# Patient Record
Sex: FEMALE | URBAN - METROPOLITAN AREA
[De-identification: names, ages, dates, MRNs, and addresses within clinical notes are randomized per-mention and may not be internally consistent; named-entity substitution may affect disease eponyms.]

---

## 2021-02-08 ENCOUNTER — OFFICE VISIT (OUTPATIENT)
Dept: URBAN - METROPOLITAN AREA CLINIC 48 | Facility: CLINIC | Age: 56
End: 2021-02-08
Payer: OTHER GOVERNMENT

## 2021-02-08 DIAGNOSIS — H43.812 VITREOUS DETACHMENT OF LEFT EYE: Primary | ICD-10-CM

## 2021-02-08 DIAGNOSIS — H25.813 COMBINED FORMS OF AGE-RELATED CATARACT, BILATERAL: ICD-10-CM

## 2021-02-08 PROCEDURE — 92002 INTRM OPH EXAM NEW PATIENT: CPT | Performed by: STUDENT IN AN ORGANIZED HEALTH CARE EDUCATION/TRAINING PROGRAM

## 2021-02-08 ASSESSMENT — INTRAOCULAR PRESSURE
OD: 14
OS: 14

## 2021-02-08 NOTE — IMPRESSION/PLAN
Impression: Combined forms of age-related cataract, bilateral: H25.813. Plan: Not visually significant at this time, will continue to monitor.

## 2021-02-08 NOTE — IMPRESSION/PLAN
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 Plan: Chronic first noted over a year ago. Discussed dx in detail with patient. Patient aware to call office if any symptoms of RD/RT change or develop. Patient defers sx at this time will continue monitoring.  

Advised patient to continue with routine exam with preferred optometrist. 

RTC PRN

## 2022-07-26 ENCOUNTER — OFFICE VISIT (OUTPATIENT)
Dept: URBAN - METROPOLITAN AREA CLINIC 49 | Facility: CLINIC | Age: 57
End: 2022-07-26
Payer: OTHER GOVERNMENT

## 2022-07-26 DIAGNOSIS — H43.812 VITREOUS DEGENERATION, LEFT EYE: ICD-10-CM

## 2022-07-26 DIAGNOSIS — H35.372 PUCKERING OF MACULA, LEFT EYE: Primary | ICD-10-CM

## 2022-07-26 PROCEDURE — 92134 CPTRZ OPH DX IMG PST SGM RTA: CPT | Performed by: OPHTHALMOLOGY

## 2022-07-26 PROCEDURE — 99204 OFFICE O/P NEW MOD 45 MIN: CPT | Performed by: OPHTHALMOLOGY

## 2022-07-26 ASSESSMENT — INTRAOCULAR PRESSURE
OS: 16
OD: 15

## 2022-07-26 NOTE — IMPRESSION/PLAN
Impression: Puckering of macula, left eye: H35.372. Plan: There is an epiretinal membrane (ERM). OCT shows no IRF/SRF OD and ERM/CME OS. We discussed the natural history as well as the risk and benefits of observation versus vitrectomy with membrane peeling. The patient understands that with vitrectomy, the vision can improve, but does not improve fully and sometimes the vision does not improve at all. Also, it can take months to years for the vision to improve. The risks of vitrectomy include but are not limited to infection, retinal tear or detachment, glaucoma, cataract formation in a phakic patient, hemorrhage, loss of eye and blindness, recurrent epiretinal membranes, need for additional surgery, and chronic cystoid macular edema. She understands I cannot guarantee vision improvement. She will call us if she decides to pursue surgery. If so, plan is: 25g PPV/MP/ICG/ILM PEEL OS
thanks Dr. Carter Raya RTC 3 months OCT OU Prior notes from other provider(s) have been reviewed in conjunction with today's visit.